# Patient Record
Sex: MALE | Race: BLACK OR AFRICAN AMERICAN
[De-identification: names, ages, dates, MRNs, and addresses within clinical notes are randomized per-mention and may not be internally consistent; named-entity substitution may affect disease eponyms.]

---

## 2020-03-20 ENCOUNTER — HOSPITAL ENCOUNTER (OUTPATIENT)
Dept: HOSPITAL 62 - OROUT | Age: 40
Discharge: HOME | End: 2020-03-20
Attending: INTERNAL MEDICINE
Payer: COMMERCIAL

## 2020-03-20 VITALS — SYSTOLIC BLOOD PRESSURE: 120 MMHG | DIASTOLIC BLOOD PRESSURE: 73 MMHG

## 2020-03-20 DIAGNOSIS — F17.200: ICD-10-CM

## 2020-03-20 DIAGNOSIS — K62.5: ICD-10-CM

## 2020-03-20 DIAGNOSIS — I10: ICD-10-CM

## 2020-03-20 DIAGNOSIS — D12.8: Primary | ICD-10-CM

## 2020-03-20 DIAGNOSIS — M79.89: ICD-10-CM

## 2020-03-20 PROCEDURE — 88305 TISSUE EXAM BY PATHOLOGIST: CPT

## 2020-03-20 PROCEDURE — 45385 COLONOSCOPY W/LESION REMOVAL: CPT

## 2020-03-20 NOTE — OPERATIVE REPORT
Operative Report


DATE OF SURGERY: 03/20/20


Operative Report: 





Risk, benefits and alternatives of the procedure including the risk of bleeding,

perforation requiring surgery have been explained to the patient in detail and 

informed consent has been obtained.  Patient is placed in a left, lateral 

decubital position.  Timeout was called.  Propofol medication is administered.  

Rectal examination is done which did not reveal any masses, tears or fissures.  

An Olympus videoscope was inserted to the patient's rectum.  Scope was then 

carefully advanced all the way to the cecum.  The cecum was identified by the 

usual anatomical landmarks including the ileocecal valve as well as the 

appendiceal office.  Photodocumentation is obtained.  Scope was then 

sequentially pulled back via the various segments of the colon including the 

ascending colon, hepatic flexure, transverse colon, splenic flexure, descending 

colon finding to the rectosigmoid portions of the colon.  Retroflexion maneuvers

performed.


PREOPERATIVE DIAGNOSIS: Blood in stool, rectal bleeding


POSTOPERATIVE DIAGNOSIS: Sigmoid polyp status post snare polypectomy.  Rectal 

polyp status post snare polypectomy.  Internal hemorrhoids


OPERATION: Colonoscopy with snare polypectomy


SURGEON: THERESE SHORE


ANESTHESIA: LMAC


TISSUE REMOVED OR ALTERED: As noted above.


COMPLICATIONS: 





None.


ESTIMATED BLOOD LOSS: None.


INTRAOPERATIVE FINDINGS: As noted above.


PROCEDURE: 





Patient tolerated the procedure well.


No immediate postprocedure complications are noted.


Patient is discharged in good condition.  Discharge date


3/20/2020.  Discharge diet: Regular.





Discharge activity: Regular.


2 to 3-week follow-up to discuss findings.  Patient is instructed to call the 

office


Proceed to the emergency room should there be any further problems questions.


Wait on the pathology.


No active bleeding noted.  3 to 5-year surveillance colonoscopy depending on the

pathology of the polyp.